# Patient Record
Sex: FEMALE | Race: WHITE | Employment: FULL TIME | URBAN - METROPOLITAN AREA
[De-identification: names, ages, dates, MRNs, and addresses within clinical notes are randomized per-mention and may not be internally consistent; named-entity substitution may affect disease eponyms.]

---

## 2017-11-30 PROBLEM — K58.2 IRRITABLE BOWEL SYNDROME WITH BOTH CONSTIPATION AND DIARRHEA: Status: ACTIVE | Noted: 2017-11-30

## 2017-11-30 PROBLEM — F41.1 GENERALIZED ANXIETY DISORDER: Status: ACTIVE | Noted: 2017-11-30

## 2018-04-18 ENCOUNTER — OFFICE VISIT (OUTPATIENT)
Dept: OBGYN CLINIC | Facility: CLINIC | Age: 38
End: 2018-04-18

## 2018-04-18 VITALS
BODY MASS INDEX: 19 KG/M2 | SYSTOLIC BLOOD PRESSURE: 119 MMHG | DIASTOLIC BLOOD PRESSURE: 84 MMHG | HEART RATE: 72 BPM | WEIGHT: 109 LBS

## 2018-04-18 DIAGNOSIS — N89.8 VAGINAL DISCHARGE: Primary | ICD-10-CM

## 2018-04-18 PROCEDURE — 99213 OFFICE O/P EST LOW 20 MIN: CPT | Performed by: OBSTETRICS & GYNECOLOGY

## 2018-04-18 NOTE — PROGRESS NOTES
Ramy Levin is a 34year old female [id number] Patient's last menstrual period was 04/06/2018 (approximate). Patient presents with:  Vaginal Problem: Pt says that she has been having some extra discharge, even when not ovulating.  Pt says that she fe 5000 units Oral Tab, One pill daily. , Disp: 30 tablet, Rfl: 11  •  peppermint oil Does not apply Oil, One pill BID., Disp: 1 Bottle, Rfl: 0    ALLERGIES:    Sulfa Antibiotics       Rash      Review of Systems:  Constitutional:  Denies fevers or chills   Ga

## 2018-04-24 ENCOUNTER — TELEPHONE (OUTPATIENT)
Dept: OBGYN CLINIC | Facility: CLINIC | Age: 38
End: 2018-04-24

## 2018-04-24 RX ORDER — FLUCONAZOLE 150 MG/1
150 TABLET ORAL SEE ADMIN INSTRUCTIONS
Qty: 2 TABLET | Refills: 0 | Status: SHIPPED | OUTPATIENT
Start: 2018-04-24 | End: 2018-05-09

## 2018-04-24 NOTE — TELEPHONE ENCOUNTER
----- Message from Vickie Prader, MD sent at 4/21/2018 10:51 AM CDT -----  Please let patient know that her vaginal culture is positive for yeast. Plan treatment with Dilfucan X2 doses 150mg 48 hours apart.  Her GC/CT was negative

## 2018-05-09 ENCOUNTER — OFFICE VISIT (OUTPATIENT)
Dept: OBGYN CLINIC | Facility: CLINIC | Age: 38
End: 2018-05-09

## 2018-05-09 VITALS
HEIGHT: 63 IN | BODY MASS INDEX: 19.14 KG/M2 | HEART RATE: 68 BPM | SYSTOLIC BLOOD PRESSURE: 104 MMHG | DIASTOLIC BLOOD PRESSURE: 67 MMHG | WEIGHT: 108 LBS

## 2018-05-09 DIAGNOSIS — Z01.419 ENCOUNTER FOR GYNECOLOGICAL EXAMINATION WITHOUT ABNORMAL FINDING: Primary | ICD-10-CM

## 2018-05-09 DIAGNOSIS — Z12.31 ENCOUNTER FOR SCREENING MAMMOGRAM FOR BREAST CANCER: ICD-10-CM

## 2018-05-09 DIAGNOSIS — N89.8 VAGINAL DISCHARGE: ICD-10-CM

## 2018-05-09 PROCEDURE — 99396 PREV VISIT EST AGE 40-64: CPT | Performed by: OBSTETRICS & GYNECOLOGY

## 2018-05-09 NOTE — PROGRESS NOTES
Well Woman Exam    HPI:  The patient is a 36y female who presents today for annual exam. She has no questions or concerns. She now has a protection order against her  and she continues to proceed with the divorce.  Her children are having a hard time RASH      Review of Systems:  Constitutional:  Denies fevers and chills   Cardiovascular:  denies chest pain or palpitations  Respiratory:  denies shortness of breath  Gastrointestinal:  denies nausea, vomiting diarrhea or constipation  Genitourinary:  Stacey Ibarra intervals for the individual  2. Reviewed breast self awareness   3. Mammogram ordered   4. Vaginal discharge  1. Treated with Diflucan a few weeks prior  2. Vaginal culture again today   5.  Follow up in 1 year

## 2018-10-29 NOTE — H&P
[facility] [address] Gastroenterology                                                                                                  Clinic History and Physical     Pa Medications, Allergies, ROS:      Past Medical History:   Diagnosis Date   • Anxiety    • Generalized anxiety disorder 11/30/2017   • Irritable bowel syndrome with both constipation and diarrhea 11/30/2017      Past Surgical History:   Procedure Laterality 119 lb (54 kg).     Gen: patient appears comfortable and in no acute distress  HEENT: conjunctiva pink, the sclera appears anicteric, oropharynx clear, mucus membranes appear moist  CV: regular rate and rhythm, the extremities are warm and well perfused   L or worsening symptoms.         Recommend:  -Schedule colonoscopy w/ Dr. Piedad Valenzuela with MAC  -Prep: Split dose TriLyte due to sulfa allergy  -Anti-platelets and anti-coagulants: None  -Diabetes and hypertensive meds: None    Colonoscopy consent: I have discussed

## 2018-11-08 ENCOUNTER — OFFICE VISIT (OUTPATIENT)
Dept: GASTROENTEROLOGY | Facility: CLINIC | Age: 38
End: 2018-11-08
Payer: COMMERCIAL

## 2018-11-08 ENCOUNTER — TELEPHONE (OUTPATIENT)
Dept: GASTROENTEROLOGY | Facility: CLINIC | Age: 38
End: 2018-11-08

## 2018-11-08 VITALS
WEIGHT: 119 LBS | BODY MASS INDEX: 21.09 KG/M2 | SYSTOLIC BLOOD PRESSURE: 140 MMHG | HEART RATE: 77 BPM | DIASTOLIC BLOOD PRESSURE: 90 MMHG | HEIGHT: 63 IN

## 2018-11-08 DIAGNOSIS — R10.9 ABDOMINAL CRAMPING: Primary | ICD-10-CM

## 2018-11-08 DIAGNOSIS — R19.8 IRREGULAR BOWEL HABITS: ICD-10-CM

## 2018-11-08 PROCEDURE — 99212 OFFICE O/P EST SF 10 MIN: CPT | Performed by: NURSE PRACTITIONER

## 2018-11-08 PROCEDURE — 99203 OFFICE O/P NEW LOW 30 MIN: CPT | Performed by: NURSE PRACTITIONER

## 2018-11-08 RX ORDER — POLYETHYLENE GLYCOL 3350, SODIUM CHLORIDE, SODIUM BICARBONATE, POTASSIUM CHLORIDE 420; 11.2; 5.72; 1.48 G/4L; G/4L; G/4L; G/4L
POWDER, FOR SOLUTION ORAL
Qty: 1 BOTTLE | Refills: 0 | Status: SHIPPED | OUTPATIENT
Start: 2018-11-08 | End: 2019-05-24 | Stop reason: ALTCHOICE

## 2018-11-08 RX ORDER — MULTIVIT-MIN/IRON FUM/FOLIC AC 7.5 MG-4
1 TABLET ORAL DAILY
COMMUNITY

## 2018-11-08 NOTE — TELEPHONE ENCOUNTER
Scheduled for:  Colonoscopy 96324  Provider Name: Dr. Eulalio Hogan  Date:  1/31/19  Location:  [facility]  Sedation:  MAC  Time:  9:00 am, arrival 8:00 am  Prep: Trilyte  Meds/Allergies Reconciled?:  Cynthia/APN reviewed.   Diagnosis with codes:  Abdominal cramping R10.9, Ir

## 2018-11-08 NOTE — PATIENT INSTRUCTIONS
1. Schedule colonoscopy with Dr. Nina Osorio w/ MAC    2.  bowel prep from pharmacy - split dose TriLyte    3. Continue all medications for procedure     4. Read all bowel prep instructions carefully    5.  AVOID seeds, nuts, popcorn, raw fruits and vegeta

## 2018-11-28 ENCOUNTER — LAB ENCOUNTER (OUTPATIENT)
Dept: LAB | Age: 38
End: 2018-11-28
Attending: NURSE PRACTITIONER
Payer: COMMERCIAL

## 2018-11-28 DIAGNOSIS — R19.8 IRREGULAR BOWEL HABITS: ICD-10-CM

## 2018-11-28 DIAGNOSIS — R10.9 ABDOMINAL CRAMPING: ICD-10-CM

## 2018-11-28 PROCEDURE — 82784 ASSAY IGA/IGD/IGG/IGM EACH: CPT | Performed by: NURSE PRACTITIONER

## 2018-11-28 PROCEDURE — 80048 BASIC METABOLIC PNL TOTAL CA: CPT

## 2018-11-28 PROCEDURE — 83516 IMMUNOASSAY NONANTIBODY: CPT

## 2018-11-28 PROCEDURE — 36415 COLL VENOUS BLD VENIPUNCTURE: CPT | Performed by: NURSE PRACTITIONER

## 2018-11-28 PROCEDURE — 80076 HEPATIC FUNCTION PANEL: CPT

## 2018-11-28 PROCEDURE — 85652 RBC SED RATE AUTOMATED: CPT

## 2018-11-28 PROCEDURE — 86140 C-REACTIVE PROTEIN: CPT

## 2018-11-28 PROCEDURE — 85025 COMPLETE CBC W/AUTO DIFF WBC: CPT

## 2018-11-29 ENCOUNTER — OFFICE VISIT (OUTPATIENT)
Dept: GASTROENTEROLOGY | Facility: CLINIC | Age: 38
End: 2018-11-29
Payer: COMMERCIAL

## 2018-11-29 ENCOUNTER — LAB ENCOUNTER (OUTPATIENT)
Dept: LAB | Age: 38
End: 2018-11-29
Attending: NURSE PRACTITIONER
Payer: COMMERCIAL

## 2018-11-29 VITALS
SYSTOLIC BLOOD PRESSURE: 121 MMHG | HEART RATE: 79 BPM | HEIGHT: 63 IN | DIASTOLIC BLOOD PRESSURE: 75 MMHG | BODY MASS INDEX: 21.26 KG/M2 | WEIGHT: 120 LBS

## 2018-11-29 DIAGNOSIS — R19.7 DIARRHEA, UNSPECIFIED TYPE: Primary | ICD-10-CM

## 2018-11-29 DIAGNOSIS — R19.8 IRREGULAR BOWEL HABITS: ICD-10-CM

## 2018-11-29 DIAGNOSIS — R19.7 DIARRHEA, UNSPECIFIED TYPE: ICD-10-CM

## 2018-11-29 DIAGNOSIS — R10.9 ABDOMINAL CRAMPING: ICD-10-CM

## 2018-11-29 PROCEDURE — 99212 OFFICE O/P EST SF 10 MIN: CPT | Performed by: INTERNAL MEDICINE

## 2018-11-29 PROCEDURE — 83993 ASSAY FOR CALPROTECTIN FECAL: CPT

## 2018-11-29 PROCEDURE — 87427 SHIGA-LIKE TOXIN AG IA: CPT

## 2018-11-29 PROCEDURE — 87493 C DIFF AMPLIFIED PROBE: CPT

## 2018-11-29 PROCEDURE — 87045 FECES CULTURE AEROBIC BACT: CPT

## 2018-11-29 PROCEDURE — 87046 STOOL CULTR AEROBIC BACT EA: CPT

## 2018-11-29 PROCEDURE — 99213 OFFICE O/P EST LOW 20 MIN: CPT | Performed by: INTERNAL MEDICINE

## 2018-11-29 PROCEDURE — 87329 GIARDIA AG IA: CPT

## 2018-11-29 PROCEDURE — 87338 HPYLORI STOOL AG IA: CPT

## 2018-11-29 PROCEDURE — 87272 CRYPTOSPORIDIUM AG IF: CPT

## 2018-11-29 NOTE — PATIENT INSTRUCTIONS
1.  Please make sure that your mints do not contain sorbitol. 2.  Submit stool testing. 3.  Await celiac testing. 4. Low-dose Imodium to control the diarrhea.   5.  Proceed with colonoscopy as scheduled unless the above testing is abnormal.  6.  Please

## 2018-11-29 NOTE — PROGRESS NOTES
HPI:    Patient ID: Karen Aguilar is a 34year old female. HPI  Sumanth Bean returns in follow-up to discuss ongoing diarrhea.   She saw STEFFANIE Summers about 3 weeks prior and is scheduled for a colonoscopy in January 2019 with Dr. William Confer (49.9 kg)  01/20/16 : 108 lb (49 kg)           Current Outpatient Medications:  Multiple Vitamins-Minerals (MULTI-VITAMIN/MINERALS) Oral Tab Take 1 tablet by mouth daily.  Disp:  Rfl:    PEG 3350-KCl-Na Bicarb-NaCl (TRILYTE) 420 g Oral Recon Soln As directe % 68   Lymphocytes %      % 24   Monocytes %      % 6   Eosinophils %      % 1   Basophils %      % 1   Neutrophils Absolute      1.8 - 7.7 K/UL 5.1   Lymphocytes Absolute      1.0 - 4.0 K/UL 1.8   Monocytes Absolute      0.0 - 1.0 K/UL 0.5   Eosinophil scheduled in January with Dr. Renate Dodson. GI infection will be appropriately treated if testing is positive.   Lower endoscopy will be accelerated if stool studies are negative for infection and the fecal calprotectin is abnormal (or an EGD will be added if the

## 2018-12-04 ENCOUNTER — PATIENT MESSAGE (OUTPATIENT)
Dept: GASTROENTEROLOGY | Facility: CLINIC | Age: 38
End: 2018-12-04

## 2018-12-04 NOTE — TELEPHONE ENCOUNTER
From: Linda Buenrostro  To: KENNY Mayberry  Sent: 12/4/2018 9:56 AM CST  Subject: Test Results Question    Hello,  Can you give me a call at your convenience to go over the results?     Thanks,  Sakshi Blackburn [phone]

## 2018-12-04 NOTE — PROGRESS NOTES
I spoke with the patient regarding concerns about her symptoms and test results. All of her questions were answered to her satisfaction.     She will keep a food/symptom journal, followed FODMAP, consideration for trial of gluten/dairy free, probiotics, an

## 2019-01-31 ENCOUNTER — HOSPITAL ENCOUNTER (OUTPATIENT)
Facility: HOSPITAL | Age: 39
Setting detail: HOSPITAL OUTPATIENT SURGERY
Discharge: HOME | End: 2019-01-31
Attending: INTERNAL MEDICINE | Admitting: INTERNAL MEDICINE
Payer: COMMERCIAL

## 2019-01-31 ENCOUNTER — ANESTHESIA EVENT (OUTPATIENT)
Dept: ENDOSCOPY | Facility: HOSPITAL | Age: 39
End: 2019-01-31
Payer: COMMERCIAL

## 2019-01-31 ENCOUNTER — TELEPHONE (OUTPATIENT)
Dept: GASTROENTEROLOGY | Facility: CLINIC | Age: 39
End: 2019-01-31

## 2019-01-31 ENCOUNTER — ANESTHESIA (OUTPATIENT)
Dept: ENDOSCOPY | Facility: HOSPITAL | Age: 39
End: 2019-01-31
Payer: COMMERCIAL

## 2019-01-31 VITALS
BODY MASS INDEX: 22.08 KG/M2 | RESPIRATION RATE: 18 BRPM | HEART RATE: 75 BPM | SYSTOLIC BLOOD PRESSURE: 107 MMHG | HEIGHT: 62 IN | DIASTOLIC BLOOD PRESSURE: 70 MMHG | WEIGHT: 120 LBS | OXYGEN SATURATION: 100 %

## 2019-01-31 DIAGNOSIS — R19.8 IRREGULAR BOWEL HABITS: ICD-10-CM

## 2019-01-31 DIAGNOSIS — R10.9 ABDOMINAL CRAMPING: ICD-10-CM

## 2019-01-31 LAB — B-HCG UR QL: NEGATIVE

## 2019-01-31 PROCEDURE — 0DBE8ZX EXCISION OF LARGE INTESTINE, VIA NATURAL OR ARTIFICIAL OPENING ENDOSCOPIC, DIAGNOSTIC: ICD-10-PCS | Performed by: INTERNAL MEDICINE

## 2019-01-31 PROCEDURE — 45380 COLONOSCOPY AND BIOPSY: CPT | Performed by: INTERNAL MEDICINE

## 2019-01-31 RX ORDER — SODIUM CHLORIDE, SODIUM LACTATE, POTASSIUM CHLORIDE, CALCIUM CHLORIDE 600; 310; 30; 20 MG/100ML; MG/100ML; MG/100ML; MG/100ML
INJECTION, SOLUTION INTRAVENOUS CONTINUOUS
Status: DISCONTINUED | OUTPATIENT
Start: 2019-01-31 | End: 2019-01-31

## 2019-01-31 RX ORDER — SODIUM CHLORIDE, SODIUM LACTATE, POTASSIUM CHLORIDE, CALCIUM CHLORIDE 600; 310; 30; 20 MG/100ML; MG/100ML; MG/100ML; MG/100ML
INJECTION, SOLUTION INTRAVENOUS CONTINUOUS PRN
Status: DISCONTINUED | OUTPATIENT
Start: 2019-01-31 | End: 2019-01-31 | Stop reason: SURG

## 2019-01-31 RX ORDER — MIDAZOLAM HYDROCHLORIDE 1 MG/ML
INJECTION INTRAMUSCULAR; INTRAVENOUS AS NEEDED
Status: DISCONTINUED | OUTPATIENT
Start: 2019-01-31 | End: 2019-01-31 | Stop reason: SURG

## 2019-01-31 RX ORDER — NALOXONE HYDROCHLORIDE 0.4 MG/ML
80 INJECTION, SOLUTION INTRAMUSCULAR; INTRAVENOUS; SUBCUTANEOUS AS NEEDED
Status: DISCONTINUED | OUTPATIENT
Start: 2019-01-31 | End: 2019-01-31

## 2019-01-31 RX ORDER — LIDOCAINE HYDROCHLORIDE 10 MG/ML
INJECTION, SOLUTION EPIDURAL; INFILTRATION; INTRACAUDAL; PERINEURAL AS NEEDED
Status: DISCONTINUED | OUTPATIENT
Start: 2019-01-31 | End: 2019-01-31 | Stop reason: SURG

## 2019-01-31 RX ADMIN — MIDAZOLAM HYDROCHLORIDE 2 MG: 1 INJECTION INTRAMUSCULAR; INTRAVENOUS at 09:19:00

## 2019-01-31 RX ADMIN — SODIUM CHLORIDE, SODIUM LACTATE, POTASSIUM CHLORIDE, CALCIUM CHLORIDE: 600; 310; 30; 20 INJECTION, SOLUTION INTRAVENOUS at 09:18:00

## 2019-01-31 RX ADMIN — SODIUM CHLORIDE, SODIUM LACTATE, POTASSIUM CHLORIDE, CALCIUM CHLORIDE: 600; 310; 30; 20 INJECTION, SOLUTION INTRAVENOUS at 09:47:00

## 2019-01-31 RX ADMIN — LIDOCAINE HYDROCHLORIDE 20 MG: 10 INJECTION, SOLUTION EPIDURAL; INFILTRATION; INTRACAUDAL; PERINEURAL at 09:20:00

## 2019-01-31 NOTE — ANESTHESIA POSTPROCEDURE EVALUATION
Patient: Francesca Barrios    Procedure Summary     Date:  01/31/19 Room / Location:  [address] [place] [address] ENDOSCOPY 05 / [address] [place] [address] ENDOSCOPY    Anesthesia Start:  3933 Anesthesia Stop:  7664    Procedure:  COLONOSCOPY (N/A ) Diagnosis:       Abdominal cramping      Irregular b

## 2019-01-31 NOTE — ANESTHESIA PREPROCEDURE EVALUATION
Anesthesia PreOp Note    HPI:     Alexus Byrd is a 37year old female who presents for preoperative consultation requested by: Edson Urban MD    Date of Surgery: 1/31/2019    Procedure(s):  COLONOSCOPY  Indication: Abdominal cramping, Stephy Riggers level: Not on file    Social Needs      Financial resource strain: Not on file      Food insecurity - worry: Not on file      Food insecurity - inability: Not on file      Transportation needs - medical: Not on file      Transportation needs - non-medical: anxiety    GI/Hepatic/Renal - negative ROS     Endo/Other - negative ROS   Abdominal  - normal exam             Anesthesia Plan:   ASA:  2  Plan:   MAC  Informed Consent Plan and Risks Discussed With:  Patient  Discussed plan with:  CRNA, attending and ivelisse

## 2019-01-31 NOTE — H&P
History & Physical Examination    Patient Name: Angelica Gomez  MRN: [id number]  [facility]: [id number]  YOB: 1980    Diagnosis: diarrhea    Medications Prior to Admission:  Multiple Vitamins-Minerals (MULTI-VITAMIN/MINERALS) Oral Tab Take 1 tab Any changes noted above. Kaylie Monreal.  [place]  1/31/2019  9:13 AM

## 2019-01-31 NOTE — TELEPHONE ENCOUNTER
10 Year colonoscopy recall placed in system per Dr. Nikita Azevedo  . Colonoscopy done on 01/31/19  . Next due on 01/31/29 . Snapshot updated.

## 2019-01-31 NOTE — OPERATIVE REPORT
[facility] Endoscopy Report      Preoperative Diagnosis:  - diarrhea      Postoperative Diagnosis:  - small internal hemorrhoids       Procedure:    Colonoscopy         Surgeon:  Nabeel Nelson M.D.     Anesthesia:  MAC sedation, see anesthe

## 2019-02-06 ENCOUNTER — TELEPHONE (OUTPATIENT)
Dept: GASTROENTEROLOGY | Facility: CLINIC | Age: 39
End: 2019-02-06

## 2019-02-06 NOTE — TELEPHONE ENCOUNTER
I spoke with the patient and discussed the biopsy results over the phone. She is scheduled for an office follow-up with me next week. We will discuss a possible course of budesonide.         Dr. Armando Perez: Would you agree w/ the following dose: Budesonide 9 m

## 2019-02-06 NOTE — PROGRESS NOTES
[facility] Follow-up Visit    Divya social ETOH  - Denies illicit drug use   - LMP: 1/8/19  -    - NSAIDs/ASA use: Occasionally    History, Medications, Allergies, ROS:      Past Medical History:   Diagnosis Date   • Anxiety    • Generalized anxiety disorder 11/30/2017   • Irritable dysuria and hematuria   SKIN:  negative for  rash  ALLERGIC/IMMUNOLOGIC:  negative for hay fever allergies  ENDOCRINE:  negative for cold intolerance and heat intolerance  MUSCULOSKELETAL:  negative for  joint stiffness and joint swelling  BEHAVIOR/PSYCH: symptom update. Consider an additional course of budesonide if symptoms continue. 2.  Screening for colon cancer: Up-to-date on colonoscopy as above.         Recommend:   See above      Orders This Visit:  No orders of the defined types were placed in t

## 2019-02-06 NOTE — TELEPHONE ENCOUNTER
----- Message from Marin Miller MD sent at 2/1/2019  5:27 PM CST -----  Norah Ruiz please contact pt and review results- increased lymphocytes ( possible early lymphocytic coltis)   Please have her follow up - consider trial of budesonide

## 2019-02-06 NOTE — TELEPHONE ENCOUNTER
Forwarded to Gladys VALIENTE--please see Dr Manoj Medina note below. Can send back to me and I can schedule appt with you. Thanks.

## 2019-02-13 ENCOUNTER — OFFICE VISIT (OUTPATIENT)
Dept: GASTROENTEROLOGY | Facility: CLINIC | Age: 39
End: 2019-02-13
Payer: COMMERCIAL

## 2019-02-13 VITALS
SYSTOLIC BLOOD PRESSURE: 127 MMHG | WEIGHT: 120 LBS | HEART RATE: 75 BPM | DIASTOLIC BLOOD PRESSURE: 92 MMHG | HEIGHT: 63 IN | BODY MASS INDEX: 21.26 KG/M2

## 2019-02-13 DIAGNOSIS — R19.8 IRREGULAR BOWEL HABITS: Primary | ICD-10-CM

## 2019-02-13 PROCEDURE — 99213 OFFICE O/P EST LOW 20 MIN: CPT | Performed by: NURSE PRACTITIONER

## 2019-02-13 PROCEDURE — 99212 OFFICE O/P EST SF 10 MIN: CPT | Performed by: NURSE PRACTITIONER

## 2019-02-13 RX ORDER — BUDESONIDE 3 MG/1
9 CAPSULE, COATED PELLETS ORAL EVERY MORNING
Qty: 90 CAPSULE | Refills: 0 | Status: SHIPPED | OUTPATIENT
Start: 2019-02-13 | End: 2019-03-15

## 2019-02-13 NOTE — PATIENT INSTRUCTIONS
1.  Start budesonide for 1 month  2. Avoid NSAIDs (ibuprofen, Motrin, Aleve)/aspirin products  3.   Follow-up over the phone or by my chart in 1-2 weeks with a symptom update

## 2019-02-28 ENCOUNTER — PATIENT MESSAGE (OUTPATIENT)
Dept: GASTROENTEROLOGY | Facility: CLINIC | Age: 39
End: 2019-02-28

## 2019-02-28 DIAGNOSIS — R19.8 IRREGULAR BOWEL HABITS: Primary | ICD-10-CM

## 2019-02-28 NOTE — TELEPHONE ENCOUNTER
From: Arielle Angulo  To: KENNY Shields  Sent: 2/28/2019 11:33 AM CST  Subject: Other    Hello,    Just wanted to give you an update on the Budesonide. I have been taking it for over a week and I do not see a change yet.  I will let you know whe

## 2019-03-12 ENCOUNTER — HOSPITAL ENCOUNTER (EMERGENCY)
Facility: HOSPITAL | Age: 39
Discharge: HOME | End: 2019-03-12
Attending: EMERGENCY MEDICINE
Payer: COMMERCIAL

## 2019-03-12 VITALS
DIASTOLIC BLOOD PRESSURE: 77 MMHG | HEIGHT: 62 IN | OXYGEN SATURATION: 100 % | HEART RATE: 79 BPM | BODY MASS INDEX: 22.08 KG/M2 | SYSTOLIC BLOOD PRESSURE: 137 MMHG | TEMPERATURE: 98 F | RESPIRATION RATE: 17 BRPM | WEIGHT: 120 LBS

## 2019-03-12 DIAGNOSIS — K08.89 PAIN, DENTAL: Primary | ICD-10-CM

## 2019-03-12 PROCEDURE — 99283 EMERGENCY DEPT VISIT LOW MDM: CPT

## 2019-03-12 RX ORDER — TRAMADOL HYDROCHLORIDE 50 MG/1
50 TABLET ORAL EVERY 6 HOURS PRN
Qty: 20 TABLET | Refills: 0 | Status: SHIPPED | OUTPATIENT
Start: 2019-03-12 | End: 2019-03-19

## 2019-03-12 NOTE — ED INITIAL ASSESSMENT (HPI)
C/o top front tooth pain after seeing dentist for pain to her gums. Her dentist told her to get a CT scan in the morning which she states she can not wait for R/T pain.

## 2019-03-14 NOTE — ED PROVIDER NOTES
Patient Seen in: [facility] Emergency Department    History   Patient presents with:  Dental Problem (dental)    Stated Complaint: Severe tooth pain    HPI    37year old female presenting with L cheek and dental pain for past few days with no kno Physical Exam   Constitutional: She is oriented to person, place, and time. She appears well-developed and well-nourished. HENT:   Head: Normocephalic and atraumatic.        Right Ear: Hearing and external ear normal.   Left Ear: Hearing and external ea Keep your appointment with your dentist as scheduled for tomorrow morning        Medications Prescribed:  Discharge Medication List as of 3/12/2019  2:49 AM    START taking these medications    traMADol HCl 50 MG Oral Tab  Take 1 tablet (50 mg total) by mo

## 2019-03-28 ENCOUNTER — HOSPITAL ENCOUNTER (OUTPATIENT)
Age: 39
Discharge: HOME | End: 2019-03-28
Payer: COMMERCIAL

## 2019-03-28 VITALS
TEMPERATURE: 98 F | SYSTOLIC BLOOD PRESSURE: 126 MMHG | RESPIRATION RATE: 18 BRPM | DIASTOLIC BLOOD PRESSURE: 83 MMHG | OXYGEN SATURATION: 100 % | WEIGHT: 119 LBS | BODY MASS INDEX: 22 KG/M2 | HEART RATE: 76 BPM

## 2019-03-28 DIAGNOSIS — N30.01 ACUTE CYSTITIS WITH HEMATURIA: Primary | ICD-10-CM

## 2019-03-28 LAB
B-HCG UR QL: NEGATIVE
BILIRUB UR QL STRIP: NEGATIVE
CLARITY UR: CLEAR
COLOR UR: YELLOW
GLUCOSE UR STRIP-MCNC: NEGATIVE MG/DL
KETONES UR STRIP-MCNC: NEGATIVE MG/DL
NITRITE UR QL STRIP: NEGATIVE
PH UR STRIP: 7 [PH]
PROT UR STRIP-MCNC: NEGATIVE MG/DL
SP GR UR STRIP: 1.01
UROBILINOGEN UR STRIP-ACNC: <2 MG/DL

## 2019-03-28 PROCEDURE — 99204 OFFICE O/P NEW MOD 45 MIN: CPT

## 2019-03-28 PROCEDURE — 87086 URINE CULTURE/COLONY COUNT: CPT | Performed by: EMERGENCY MEDICINE

## 2019-03-28 PROCEDURE — 81003 URINALYSIS AUTO W/O SCOPE: CPT

## 2019-03-28 PROCEDURE — 99214 OFFICE O/P EST MOD 30 MIN: CPT

## 2019-03-28 PROCEDURE — 81025 URINE PREGNANCY TEST: CPT

## 2019-03-28 RX ORDER — FLUCONAZOLE 200 MG/1
200 TABLET ORAL DAILY
Qty: 2 TABLET | Refills: 0 | Status: SHIPPED | OUTPATIENT
Start: 2019-03-28 | End: 2019-12-12 | Stop reason: ALTCHOICE

## 2019-03-28 RX ORDER — PHENAZOPYRIDINE HYDROCHLORIDE 100 MG/1
100 TABLET, FILM COATED ORAL 3 TIMES DAILY PRN
Qty: 6 TABLET | Refills: 0 | Status: SHIPPED | OUTPATIENT
Start: 2019-03-28 | End: 2019-04-04

## 2019-03-28 RX ORDER — CEPHALEXIN 500 MG/1
500 CAPSULE ORAL 2 TIMES DAILY
Qty: 10 CAPSULE | Refills: 0 | Status: SHIPPED | OUTPATIENT
Start: 2019-03-28 | End: 2019-04-02

## 2019-03-28 NOTE — ED INITIAL ASSESSMENT (HPI)
Urinary frequency and pain with urination since yesterday. Denies fever, abdominal pain or flank pain.

## 2019-03-28 NOTE — ED PROVIDER NOTES
Patient Seen in: [facility] Immediate Care In [place]    History   Patient presents with:  Urinary Symptoms (urologic)    Stated Complaint: uti    HPI    Patient complains of urinary frequency, urgency and dysuria that began 1 day ago.   Patient use: No      Review of Systems    Positive for stated complaint: uti  Other systems are as noted in HPI. Constitutional and vital signs reviewed. All other systems reviewed and negative except as noted above.     PSFH elements reviewed from today and take the 2nd dose, TAKE AFTER YOUR ANTIBIOTIC COURSE IS FINISHED. AS NEEDED FOR YEAST INFECTION. Qty: 2 tablet Refills: 0    Phenazopyridine HCl 100 MG Oral Tab  Take 1 tablet (100 mg total) by mouth 3 (three) times daily as needed for Pain.   Qty: 6 table

## 2019-04-01 NOTE — PROGRESS NOTES
I spoke with the patient and relayed to Dr. Serge Guardado message. The patient would prefer to start with non-medicinal options as previously documented. She would also be interested in speaking with one of the [place] dietitian's.   I will place a referral fo

## 2019-04-01 NOTE — PROGRESS NOTES
Dr. Nga Green,    I originally saw this patient in November 2018 with complaints of abdominal cramping and irregular bowel habits. Completed CLN in January 2019 - demonstrated increased lymphocyte (possible early lymphocytic colitis).  I started her on a tri

## 2019-04-01 NOTE — PROGRESS NOTES
I would support trial of treatment for IBS- D and consider input from GI at [facility]

## 2019-04-04 NOTE — TELEPHONE ENCOUNTER
Pt contacted, given OP registration to call and set up dietary appt. Accepted f/u appt with Fernanda Triaan June 4, instructed to arrive by 8:15am and given directions to [facility]. We do not have an insurance card in epic. States she is driving--it is a PPO.  She w

## 2019-08-29 ENCOUNTER — HOSPITAL ENCOUNTER (OUTPATIENT)
Age: 39
Discharge: HOME | End: 2019-08-29
Payer: COMMERCIAL

## 2019-08-29 VITALS
TEMPERATURE: 99 F | OXYGEN SATURATION: 98 % | DIASTOLIC BLOOD PRESSURE: 67 MMHG | HEART RATE: 94 BPM | RESPIRATION RATE: 20 BRPM | WEIGHT: 115 LBS | BODY MASS INDEX: 21.16 KG/M2 | SYSTOLIC BLOOD PRESSURE: 110 MMHG | HEIGHT: 62 IN

## 2019-08-29 DIAGNOSIS — N30.90 CYSTITIS: Primary | ICD-10-CM

## 2019-08-29 LAB — B-HCG UR QL: NEGATIVE

## 2019-08-29 PROCEDURE — 99214 OFFICE O/P EST MOD 30 MIN: CPT

## 2019-08-29 PROCEDURE — 87086 URINE CULTURE/COLONY COUNT: CPT | Performed by: NURSE PRACTITIONER

## 2019-08-29 PROCEDURE — 81025 URINE PREGNANCY TEST: CPT

## 2019-08-29 PROCEDURE — 87186 SC STD MICRODIL/AGAR DIL: CPT | Performed by: NURSE PRACTITIONER

## 2019-08-29 PROCEDURE — 87088 URINE BACTERIA CULTURE: CPT | Performed by: NURSE PRACTITIONER

## 2019-08-29 RX ORDER — CEPHALEXIN 500 MG/1
500 CAPSULE ORAL 3 TIMES DAILY
Qty: 21 CAPSULE | Refills: 0 | Status: SHIPPED | OUTPATIENT
Start: 2019-08-29 | End: 2019-09-05

## 2019-08-29 RX ORDER — FLUCONAZOLE 150 MG/1
150 TABLET ORAL ONCE
Qty: 1 TABLET | Refills: 0 | Status: SHIPPED | OUTPATIENT
Start: 2019-08-29 | End: 2019-08-29

## 2019-08-29 NOTE — ED PROVIDER NOTES
Patient presents with:  Urinary Symptoms (urologic)      HPI:     Ghada Cruz is a 37year old female who presents with a chief complaint of dysuria, urgency, and frequency for the past the past few days.  She has had tyrone hematuria started this mor together: Not on file        Attends [employer] service: Not on file        Active member of club or organization: Not on file        Attends meetings of clubs or organizations: Not on file        Relationship status: Not on file      Intimate partner viole [address] [facility] POCT PREGNANCY URINE    Collection Time: 08/29/19 10:33 AM   Result Value Ref Range    POCT Urine Pregnancy Negative Negative       MDM:   Urine pregnancy test is negative. The urine is to bloody to dip. A clear urine culture is pending.   I will pl

## 2019-08-29 NOTE — ED INITIAL ASSESSMENT (HPI)
Woke up at 3am with hematuria, frequency, and dysuria. Fever and chills yesterday. Lower abdominal pressure. No back pain.

## 2019-09-03 PROCEDURE — 87086 URINE CULTURE/COLONY COUNT: CPT | Performed by: FAMILY MEDICINE

## 2019-09-03 PROCEDURE — 81001 URINALYSIS AUTO W/SCOPE: CPT | Performed by: FAMILY MEDICINE

## 2019-12-18 ENCOUNTER — LAB ENCOUNTER (OUTPATIENT)
Dept: LAB | Facility: HOSPITAL | Age: 39
End: 2019-12-18
Attending: OBSTETRICS & GYNECOLOGY
Payer: COMMERCIAL

## 2019-12-18 ENCOUNTER — OFFICE VISIT (OUTPATIENT)
Dept: OBGYN CLINIC | Facility: CLINIC | Age: 39
End: 2019-12-18
Payer: COMMERCIAL

## 2019-12-18 VITALS
DIASTOLIC BLOOD PRESSURE: 80 MMHG | WEIGHT: 122 LBS | BODY MASS INDEX: 22 KG/M2 | SYSTOLIC BLOOD PRESSURE: 124 MMHG | HEART RATE: 80 BPM

## 2019-12-18 DIAGNOSIS — Z11.3 SCREEN FOR STD (SEXUALLY TRANSMITTED DISEASE): Primary | ICD-10-CM

## 2019-12-18 DIAGNOSIS — Z11.3 SCREEN FOR STD (SEXUALLY TRANSMITTED DISEASE): ICD-10-CM

## 2019-12-18 PROCEDURE — 87340 HEPATITIS B SURFACE AG IA: CPT

## 2019-12-18 PROCEDURE — 99213 OFFICE O/P EST LOW 20 MIN: CPT | Performed by: OBSTETRICS & GYNECOLOGY

## 2019-12-18 PROCEDURE — 87389 HIV-1 AG W/HIV-1&-2 AB AG IA: CPT

## 2019-12-18 PROCEDURE — 86780 TREPONEMA PALLIDUM: CPT

## 2019-12-18 PROCEDURE — 86803 HEPATITIS C AB TEST: CPT

## 2019-12-18 PROCEDURE — 36415 COLL VENOUS BLD VENIPUNCTURE: CPT

## 2019-12-18 NOTE — PROGRESS NOTES
Josefina Zimmer is a 37year old female [id number] Patient's last menstrual period was 12/09/2019. Patient presents with:  Gyn Problem: std testing      The patient is a 39yo female who presents today for STD screening.  She got a divorce last year and has Binge frequency: Never        Comment: socially      Drug use: No      Sexual activity: Not on file    Lifestyle      Physical activity:        Days per week: Not on file        Minutes per session: Not on file      Stress: Not on file    Relationships any breast pain, lumps, or discharge.    Neurological:  denies headaches, blurred or double vision   Psychiatric: Mood stable      12/18/19  1302   BP: 124/80   Pulse: 80       PHYSICAL EXAM:   Constitutional: well developed, well nourished  Head/Face: norm

## 2019-12-20 ENCOUNTER — TELEPHONE (OUTPATIENT)
Dept: OBGYN CLINIC | Facility: CLINIC | Age: 39
End: 2019-12-20

## 2020-02-03 ENCOUNTER — HOSPITAL ENCOUNTER (OUTPATIENT)
Age: 40
Discharge: HOME | End: 2020-02-03
Attending: EMERGENCY MEDICINE
Payer: COMMERCIAL

## 2020-02-03 VITALS
DIASTOLIC BLOOD PRESSURE: 77 MMHG | RESPIRATION RATE: 18 BRPM | OXYGEN SATURATION: 99 % | WEIGHT: 120 LBS | BODY MASS INDEX: 21.26 KG/M2 | HEART RATE: 85 BPM | TEMPERATURE: 98 F | HEIGHT: 63 IN | SYSTOLIC BLOOD PRESSURE: 127 MMHG

## 2020-02-03 DIAGNOSIS — N30.00 ACUTE CYSTITIS WITHOUT HEMATURIA: Primary | ICD-10-CM

## 2020-02-03 LAB
B-HCG UR QL: NEGATIVE
GLUCOSE UR STRIP-MCNC: NEGATIVE MG/DL
KETONES UR STRIP-MCNC: 15 MG/DL
NITRITE UR QL STRIP: NEGATIVE
PH UR STRIP: 6 [PH]
PROT UR STRIP-MCNC: 30 MG/DL
SP GR UR STRIP: >=1.03
UROBILINOGEN UR STRIP-ACNC: <2 MG/DL

## 2020-02-03 PROCEDURE — 81002 URINALYSIS NONAUTO W/O SCOPE: CPT

## 2020-02-03 PROCEDURE — 99213 OFFICE O/P EST LOW 20 MIN: CPT

## 2020-02-03 PROCEDURE — 99214 OFFICE O/P EST MOD 30 MIN: CPT

## 2020-02-03 PROCEDURE — 81025 URINE PREGNANCY TEST: CPT

## 2020-02-03 RX ORDER — NITROFURANTOIN 25; 75 MG/1; MG/1
100 CAPSULE ORAL 2 TIMES DAILY
Qty: 10 CAPSULE | Refills: 0 | Status: SHIPPED | OUTPATIENT
Start: 2020-02-03 | End: 2020-02-08

## 2020-02-03 RX ORDER — FLUCONAZOLE 150 MG/1
150 TABLET ORAL ONCE
Qty: 2 TABLET | Refills: 0 | Status: SHIPPED | OUTPATIENT
Start: 2020-02-03 | End: 2020-02-03

## 2020-02-03 NOTE — ED PROVIDER NOTES
Patient Seen in: [facility] Immediate Care In [address] [place] [address]    History   Patient presents with:  Urinary Symptoms    Stated Complaint: UTI     HPI    Patient is here with complaint of burning with urination and urge to go this morning.   No back pain no no abdominal pain, no nausea, no vomiting  Musculoskeletal: no back pain      Positive for stated complaint: UTI   Other systems are as noted in HPI. Constitutional and vital signs reviewed.       All other systems reviewed and negative except as noted abo Normal        MDM     99% Normal  Pulse oximetry     Disposition and Plan     We recommend that you schedule follow up care with a primary care provider within the next three months to obtain basic health screening including reassessment of your blood pres

## 2023-01-02 ENCOUNTER — E-VISIT (OUTPATIENT)
Dept: FAMILY MEDICINE CLINIC | Facility: CLINIC | Age: 43
End: 2023-01-02

## 2023-01-02 ENCOUNTER — E-VISIT (OUTPATIENT)
Dept: TELEHEALTH | Age: 43
End: 2023-01-02
Payer: COMMERCIAL

## 2023-01-02 DIAGNOSIS — N30.00 ACUTE CYSTITIS WITHOUT HEMATURIA: Primary | ICD-10-CM

## 2023-01-02 DIAGNOSIS — N39.0 URINARY TRACT INFECTION WITHOUT HEMATURIA, SITE UNSPECIFIED: Primary | ICD-10-CM

## 2023-01-02 RX ORDER — FLUCONAZOLE 150 MG/1
150 TABLET ORAL ONCE
Qty: 1 TABLET | Refills: 0 | Status: SHIPPED | OUTPATIENT
Start: 2023-01-02 | End: 2023-01-02

## 2023-01-02 RX ORDER — NITROFURANTOIN 25; 75 MG/1; MG/1
100 CAPSULE ORAL 2 TIMES DAILY
Qty: 14 CAPSULE | Refills: 0 | Status: SHIPPED | OUTPATIENT
Start: 2023-01-02 | End: 2023-01-09

## 2023-01-04 ENCOUNTER — E-VISIT (OUTPATIENT)
Dept: FAMILY MEDICINE CLINIC | Facility: CLINIC | Age: 43
End: 2023-01-04

## 2023-01-04 DIAGNOSIS — Z02.9 ENCOUNTERS FOR ADMINISTRATIVE PURPOSES: Primary | ICD-10-CM

## 2023-01-04 NOTE — PROGRESS NOTES
Spoke with patient. Recently treated for UTI symptoms with Macrobid. She is experiencing general body aches/fatigue/headache. Unsure if it is medication related or a sign or new infection/worsening infection. She statues she has hx of UTIs in the past and this infection has felt different as far as pain. Since starting the antibiotic, urinary symptoms have improved.  She has taken Macrobid in the past without issue   At this point, referred patient to immediate care for further workup/testing

## 2023-09-05 ENCOUNTER — LAB ENCOUNTER (OUTPATIENT)
Dept: LAB | Age: 43
End: 2023-09-05
Attending: NURSE PRACTITIONER
Payer: COMMERCIAL

## 2023-09-05 ENCOUNTER — E-VISIT (OUTPATIENT)
Dept: TELEHEALTH | Age: 43
End: 2023-09-05

## 2023-09-05 DIAGNOSIS — N30.00 ACUTE CYSTITIS WITHOUT HEMATURIA: ICD-10-CM

## 2023-09-05 DIAGNOSIS — N30.00 ACUTE CYSTITIS WITHOUT HEMATURIA: Primary | ICD-10-CM

## 2023-09-05 PROCEDURE — 87086 URINE CULTURE/COLONY COUNT: CPT | Performed by: NURSE PRACTITIONER

## 2023-09-05 PROCEDURE — 99421 OL DIG E/M SVC 5-10 MIN: CPT | Performed by: NURSE PRACTITIONER

## 2023-09-05 RX ORDER — NITROFURANTOIN 25; 75 MG/1; MG/1
100 CAPSULE ORAL 2 TIMES DAILY
Qty: 14 CAPSULE | Refills: 0 | Status: SHIPPED | OUTPATIENT
Start: 2023-09-05 | End: 2023-09-12

## 2023-09-05 NOTE — PROGRESS NOTES
Sanjay Doran is a 37year old female. HPI:   See answers to questions above. Urinary freuqency, urinary urgency. No dysuria. Has prior hx of UTI. Current Outpatient Medications   Medication Sig Dispense Refill    nitrofurantoin monohydrate macro (MACROBID) 100 MG Oral Cap Take 1 capsule (100 mg total) by mouth 2 (two) times daily for 7 days. 14 capsule 0    escitalopram (LEXAPRO) 10 MG Oral Tab Take 1 tablet (10 mg total) by mouth daily. 90 tablet 1    Fluticasone Propionate (FLONASE) 50 MCG/ACT Nasal Suspension 2 sprays by Each Nare route daily. 1 Inhaler 1    Albuterol Sulfate  (90 Base) MCG/ACT Inhalation Aero Soln Inhale 2 puffs into the lungs every 4 (four) hours as needed for Wheezing or Shortness of Breath (cough). 1 Inhaler 1    Multiple Vitamins-Minerals (MULTI-VITAMIN/MINERALS) Oral Tab Take 1 tablet by mouth daily. Past Medical History:   Diagnosis Date    Anxiety     Generalized anxiety disorder 2017    Irritable bowel syndrome with both constipation and diarrhea 2017      Past Surgical History:   Procedure Laterality Date          COLONOSCOPY N/A 2019    Procedure: COLONOSCOPY;  Surgeon: Alda Victor MD;  Location: [facility] ENDOSCOPY      Family History   Problem Relation Age of Onset    Other (brain tumor) Father         likely lung metastasis    Other (Other) Mother         anxiety    Other (anal cancer) Maternal Grandmother     No Known Problems Sister     No Known Problems Brother       Social History:  Social History     Socioeconomic History    Marital status:    Tobacco Use    Smoking status: Every Day     Packs/day: 1.00     Types: Cigarettes    Smokeless tobacco: Never    Tobacco comments:     quit 6 yrs ago   Vaping Use    Vaping Use: Never used   Substance and Sexual Activity    Alcohol use:  Yes     Alcohol/week: 0.0 - 2.0 standard drinks of alcohol     Comment: socially    Drug use: No         ASSESSMENT AND PLAN:     Acute cystitis without hematuria  (primary encounter diagnosis)    Urine culture ordered through lab. Empiric treatment as below to be started after completing urine sample. Provided with comfort measures  Advised to seek in person evaluation for new or worsening symptoms. Meds & Refills for this Visit:  Requested Prescriptions     Signed Prescriptions Disp Refills    nitrofurantoin monohydrate macro (MACROBID) 100 MG Oral Cap 14 capsule 0     Sig: Take 1 capsule (100 mg total) by mouth 2 (two) times daily for 7 days.        Duration of  the service:  8 minutes    Patient advised to follow up with PCP if no improvement or worsening of symptoms  Refer to [employer] message for specific patient instructions

## 2023-12-15 ENCOUNTER — HOSPITAL ENCOUNTER (OUTPATIENT)
Age: 43
Discharge: HOME | End: 2023-12-15
Payer: COMMERCIAL

## 2023-12-15 VITALS
HEART RATE: 100 BPM | TEMPERATURE: 100 F | DIASTOLIC BLOOD PRESSURE: 72 MMHG | OXYGEN SATURATION: 98 % | RESPIRATION RATE: 20 BRPM | SYSTOLIC BLOOD PRESSURE: 128 MMHG

## 2023-12-15 DIAGNOSIS — J01.00 ACUTE NON-RECURRENT MAXILLARY SINUSITIS: Primary | ICD-10-CM

## 2023-12-15 DIAGNOSIS — J11.1 INFLUENZA: ICD-10-CM

## 2023-12-15 LAB
POCT INFLUENZA A: POSITIVE
POCT INFLUENZA B: NEGATIVE
S PYO AG THROAT QL IA.RAPID: NEGATIVE

## 2023-12-15 PROCEDURE — 99213 OFFICE O/P EST LOW 20 MIN: CPT

## 2023-12-15 PROCEDURE — 87651 STREP A DNA AMP PROBE: CPT | Performed by: PHYSICIAN ASSISTANT

## 2023-12-15 PROCEDURE — 99204 OFFICE O/P NEW MOD 45 MIN: CPT

## 2023-12-15 PROCEDURE — 87502 INFLUENZA DNA AMP PROBE: CPT | Performed by: PHYSICIAN ASSISTANT

## 2023-12-15 RX ORDER — BENZONATATE 100 MG/1
100 CAPSULE ORAL 3 TIMES DAILY PRN
Qty: 30 CAPSULE | Refills: 0 | Status: SHIPPED | OUTPATIENT
Start: 2023-12-15 | End: 2024-01-14

## 2023-12-15 RX ORDER — OXYMETAZOLINE HYDROCHLORIDE 0.05 G/100ML
1 SPRAY NASAL EVERY 4 HOURS PRN
Qty: 15 ML | Refills: 0 | Status: SHIPPED | OUTPATIENT
Start: 2023-12-15 | End: 2023-12-22

## 2023-12-15 RX ORDER — DOXYCYCLINE HYCLATE 100 MG/1
100 CAPSULE ORAL 2 TIMES DAILY
Qty: 14 CAPSULE | Refills: 0 | Status: SHIPPED | OUTPATIENT
Start: 2023-12-15 | End: 2023-12-22

## 2024-08-06 ENCOUNTER — HOSPITAL ENCOUNTER (OUTPATIENT)
Age: 44
Discharge: HOME | End: 2024-08-06
Attending: EMERGENCY MEDICINE
Payer: COMMERCIAL

## 2024-08-06 VITALS
OXYGEN SATURATION: 100 % | TEMPERATURE: 97 F | HEART RATE: 86 BPM | DIASTOLIC BLOOD PRESSURE: 60 MMHG | RESPIRATION RATE: 16 BRPM | SYSTOLIC BLOOD PRESSURE: 131 MMHG

## 2024-08-06 DIAGNOSIS — R21 DIFFUSE PAPULAR RASH: Primary | ICD-10-CM

## 2024-08-06 LAB — SARS-COV-2 RNA RESP QL NAA+PROBE: NOT DETECTED

## 2024-08-06 PROCEDURE — 99214 OFFICE O/P EST MOD 30 MIN: CPT

## 2024-08-06 PROCEDURE — 99213 OFFICE O/P EST LOW 20 MIN: CPT

## 2024-08-06 RX ORDER — CEPHALEXIN 500 MG/1
500 CAPSULE ORAL 3 TIMES DAILY
Qty: 15 CAPSULE | Refills: 0 | Status: SHIPPED | OUTPATIENT
Start: 2024-08-06 | End: 2024-08-11

## 2024-08-06 RX ORDER — METHYLPREDNISOLONE 4 MG/1
TABLET ORAL
Qty: 1 EACH | Refills: 0 | Status: SHIPPED | OUTPATIENT
Start: 2024-08-06

## 2024-08-06 NOTE — ED INITIAL ASSESSMENT (HPI)
Patient arrives ambulatory with c/o bumps all over body, mostly to lower abdomen/back/trunk/upper thigh. Reports also they tend to pop up when she itches over them. No recent antibiotic use. Reports also feeling tired, eye heavy, \"throat feels strange\".

## 2024-08-06 NOTE — ED PROVIDER NOTES
Patient Seen in: [facility] [place]      History     Chief Complaint   Patient presents with    Rash Skin Problem     Stated Complaint: Allergies    Subjective:   HPI    Patient is a 44-year-old female with no significant past medical history who presents now with skin rash, itching.  The patient states she has noted some increased itching to her back over the last several weeks.  However, since , the patient has developed multiple red lesions diffusely, most pronounced on the lower back and buttock area but also on the arms and legs.  Patient states she does not believe they are bug bites.  The lesions are significantly itchy.  The patient states she has had difficulty not itching the lesions.  Patient states she has had some mild fatigue over the last few days.  Patient has had some minimal URI symptoms.    Objective:   Past Medical History:    Anxiety    Generalized anxiety disorder    Irritable bowel syndrome with both constipation and diarrhea              Past Surgical History:   Procedure Laterality Date          Colonoscopy N/A 2019    Procedure: COLONOSCOPY;  Surgeon: Kris Quintanilla MD;  Location: [facility] ENDOSCOPY                Social History     Socioeconomic History    Marital status:    Tobacco Use    Smoking status: Every Day     Current packs/day: 1.00     Types: Cigarettes    Smokeless tobacco: Never    Tobacco comments:     quit 6 yrs ago   Vaping Use    Vaping status: Never Used   Substance and Sexual Activity    Alcohol use: Yes     Alcohol/week: 0.0 - 2.0 standard drinks of alcohol     Comment: socially    Drug use: No              Review of Systems    Positive for stated Chief Complaint: Rash Skin Problem    Other systems are as noted in HPI.  Constitutional and vital signs reviewed.      All other systems reviewed and negative except as noted above.    Physical Exam     ED Triage Vitals [24 1245]   /60   Pulse 86   Resp 16   Temp 97.2 °F (36.2 °C)    Temp src Temporal   SpO2 100 %   O2 Device None (Room air)       Current Vitals:   Vital Signs  BP: 131/60  Pulse: 86  Resp: 16  Temp: 97.2 °F (36.2 °C)  Temp src: Temporal    Oxygen Therapy  SpO2: 100 %  O2 Device: None (Room air)            Physical Exam    Constitutional: Well-developed well-nourished in no acute distress  Head: Normocephalic, no swelling or tenderness  Eyes: Nonicteric sclera, no conjunctival injection  ENT: TMs are clear and flat bilaterally.  There is no posterior pharyngeal erythema  Chest: Clear to auscultation, no tenderness  Cardiovascular: Regular rate and rhythm without murmur  Abdomen: Soft, nontender and nondistended  Neurologic: Patient is awake, alert and oriented ×3.  The patient's motor strength is 5 out of 5 and symmetric in the upper and lower extremities bilaterally  Extremities: No focal swelling or tenderness  Skin: There are multiple erythematous papular lesions, most pronounced on the lower posterior back and buttock areas.  There are additional lesions on the arms and legs.  There is mild surrounding erythema.  There is no noted induration or fluctuance          ED Course     Labs Reviewed   RAPID SARS-COV-2 BY PCR - Normal     Patient's negative COVID was discussed with the patient.  Will initiate Medrol Dosepak for possible allergic component.  In addition we will cover with Keflex for possible infectious component.  Will provide with dermatologic follow-up                 MDM      Insect bites with cellulitis versus localized allergic reaction                                   Medical Decision Making      Disposition and Plan     Clinical Impression:  1. Diffuse papular rash         Disposition:  Discharge  8/6/2024  1:23 pm    Follow-up:  Clarissa Hummel MD  [address] [place] [address]  [phone]      Call for an appointment for any persistent rash          Medications Prescribed:  Discharge Medication List as of 8/6/2024  1:27 PM        START taking  these medications    Details   methylPREDNISolone (MEDROL) 4 MG Oral Tablet Therapy Pack Dosepack: take as directed, Normal, Disp-1 each, R-0      cephalexin 500 MG Oral Cap Take 1 capsule (500 mg total) by mouth 3 (three) times daily for 5 days., Normal, Disp-15 capsule, R-0

## 2025-03-25 ENCOUNTER — HOSPITAL ENCOUNTER (OUTPATIENT)
Age: 45
Discharge: HOME | End: 2025-03-25
Attending: EMERGENCY MEDICINE
Payer: COMMERCIAL

## 2025-03-25 VITALS
SYSTOLIC BLOOD PRESSURE: 122 MMHG | DIASTOLIC BLOOD PRESSURE: 69 MMHG | TEMPERATURE: 98 F | HEART RATE: 75 BPM | RESPIRATION RATE: 16 BRPM | OXYGEN SATURATION: 100 %

## 2025-03-25 DIAGNOSIS — H10.9 CONJUNCTIVITIS OF RIGHT EYE, UNSPECIFIED CONJUNCTIVITIS TYPE: Primary | ICD-10-CM

## 2025-03-25 PROCEDURE — 99213 OFFICE O/P EST LOW 20 MIN: CPT

## 2025-03-25 RX ORDER — OFLOXACIN 3 MG/ML
2 SOLUTION/ DROPS OPHTHALMIC 4 TIMES DAILY
Qty: 10 ML | Refills: 0 | Status: SHIPPED | OUTPATIENT
Start: 2025-03-25 | End: 2025-03-30

## 2025-03-25 NOTE — ED PROVIDER NOTES
Patient Seen in: [facility]      History     Chief Complaint   Patient presents with    Eye Visual Problem     Stated Complaint: Right Eye Problem    Subjective:   HPI      The patient is a 45-year-old female with no significant past medical history presents now with right eye swelling, discharge.  The patient states that her right eye began to feel somewhat irritated yesterday.  Patient denies any trauma.  Patient does not wear contact lenses.  Patient states the eye continues to feel somewhat irritated today and patient has noted some mild swelling of the right lower lid.  Patient denies any visual changes    Objective:     No pertinent past medical history.            No pertinent past surgical history.              No pertinent social history.            Review of Systems    Positive for stated complaint: Right Eye Problem  Other systems are as noted in HPI.  Constitutional and vital signs reviewed.      All other systems reviewed and negative except as noted above.    Physical Exam     ED Triage Vitals [03/25/25 1209]   /69   Pulse 75   Resp 16   Temp 98.3 °F (36.8 °C)   Temp src Oral   SpO2 100 %   O2 Device None (Room air)       Current Vitals:   Vital Signs  BP: 122/69  Pulse: 75  Resp: 16  Temp: 98.3 °F (36.8 °C)  Temp src: Oral    Oxygen Therapy  SpO2: 100 %  O2 Device: None (Room air)      Right Eye Chart Acuity: 20/30, Uncorrected  Left Eye Chart Acuity: 20/30, Uncorrected  Physical Exam      Constitutional: Well-developed well-nourished in no acute distress  Head: Normocephalic, no swelling or tenderness  Eyes: Nonicteric sclera, minimal injection of the right conjunctiva.  There is no foreign body seen.  On fluorescein staining, there is no uptake noted.  Extraocular movements are intact.  There is minimal swelling of the right lower eyelid but no obvious stye.  ENT: TMs are clear and flat bilaterally.  There is no posterior pharyngeal erythema  Chest: Clear to auscultation, no  tenderness  Cardiovascular: Regular rate and rhythm without murmur  Abdomen: Soft, nontender and nondistended  Neurologic: Patient is awake, alert and oriented ×3.  The patient's motor strength is 5 out of 5 and symmetric in the upper and lower extremities bilaterally  Extremities: No focal swelling or tenderness  Skin: No pallor, no redness or warmth to the touch      ED Course   Labs Reviewed - No data to display         Will initiate ofloxacin eyedrops and provide with ophthalmologic follow-up for any persistent/worsening symptoms       MDM      Conjunctivitis versus foreign body versus stye        Medical Decision Making      Disposition and Plan     Clinical Impression:  1. Conjunctivitis of right eye, unspecified conjunctivitis type         Disposition:  Discharge  3/25/2025 12:25 pm    Follow-up:  Jeevan Gurrola MD  [address]  [address]  [facility] [address] [phone]      As needed          Medications Prescribed:  Current Discharge Medication List        START taking these medications    Details   ofloxacin 0.3 % Ophthalmic Solution Place 2 drops into the right eye 4 (four) times daily for 5 days.  Qty: 10 mL, Refills: 0                 Supplementary Documentation:

## 2025-03-25 NOTE — ED INITIAL ASSESSMENT (HPI)
Pt complaining of right eye watering and pain x 1 day. + crusty discharge this am. No injury. Does not wear contacts.

## (undated) DEVICE — FORCEP RADIAL JAW 4

## (undated) DEVICE — LINE MNTR ADLT SET O2 INTMD

## (undated) DEVICE — Device: Brand: CUSTOM PROCEDURE KIT

## (undated) NOTE — ED AVS SNAPSHOT
Dalia Monzon   MRN: [id number]    Department:  [facility] Department   Date of Visit:  3/12/2019           Disclosure     Insurance plans vary and the physician(s) referred by the ER may not be covered by your plan.  Please conta CARE PHYSICIAN AT ONCE OR RETURN IMMEDIATELY TO THE EMERGENCY DEPARTMENT. If you have been prescribed any medication(s), please fill your prescription right away and begin taking the medication(s) as directed.   If you believe that any of the medications